# Patient Record
(demographics unavailable — no encounter records)

---

## 2025-06-25 NOTE — ADDENDUM
[FreeTextEntry1] : This note was written by Kenji Manjarrez on 06/25/2025 acting as scribe for Dr. Martinez Pedraza M.D.   I, Dr. Martinez Pedraza, have read and attest that all the information, medical decision making and discharge instructions within are true and accurate.

## 2025-06-25 NOTE — HISTORY OF PRESENT ILLNESS
[de-identified] : CORBY SIMON  65 year old female presents for evaluation of right knee pain for a 5-yr duration with no one incident of injury but is on and off with several different types of injury. 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 She states that she is now 180 pounds and was 250 pounds previously and noticed that it was contributing to the knee pain. It is mostly medial pain but mostly dull than sharp but can be sharp at times with swelling but no mechanical Sx. She can walk up to 1 mile and stairs are hard. She takes Advil for intermittent pain relief. She is doing PT and has not had any injection or surgery. 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 Allergy to Penicillin, Vancomycin with hearing loss, Medrol Dosepak leading to rash, and Pantoprazole which causes rash. She is interested in continuing PT and injections vs. surgery. She did get an MRI of the right knee on June 17 which showed diffuse tricompartmental degenerative arthritis moderate medial compartment with associated complex degenerative tearing of the body segment.

## 2025-06-25 NOTE — PROCEDURE
[de-identified] : RIGHT KNEE CORTISONE INJECTION Discussed at length with the patient the planned steroid and lidocaine injection. The risks, benefits, convalescence and alternatives were reviewed. The possible side effects discussed included but were not limited to: pain, swelling, heat and redness. These symptoms are generally mild but if they are extensive then contact the office. Giving pain relievers by mouth such as NSAIDs or Tylenol can generally treat the reactions to steroid and lidocaine. Rare cases of infection have been noted. Rash, hives and itching may occur post injection. If you have muscle pain or cramps, flushing and or swelling of the face, rapid heart beat, nausea, dizziness, fever, chills, headache, difficulty breathing, swelling in the arms or legs, or have a prickly feeling of your skin, contact a health care provider immediately.   Following this discussion, the knee was prepped with betadine and under sterile conditions 5 cc of 2% lidocaine and 1 cc depo-medrol (40mg) were injected with a 21 gauge needle. The needle was introduced into the joint, aspiration was performed to ensure intra-articular placement and the medication was injected. Upon withdrawal of the needle the site was cleaned with alcohol and a bandaid applied. The patient tolerated the injection well and there were no adverse effects. Post injection instructions included no strenuous activity for 24 hours, cryotherapy and if there are any adverse effects to contact the office.

## 2025-06-25 NOTE — DISCUSSION/SUMMARY
[de-identified] : Discussed at length the nature of the patients condition. Their right knee symptoms appear secondary to degenerative arthritis and torn medial meniscus. We reviewed operative and nonoperative treatment. In the future, she may need a TKR as I do not think a surgical arthroscopy is warranted. At the present time, we will begin nonoperatively. We will schedule for right knee viscosupplementation injections.  In the interim, for the acute pain patient was given a right knee cortisone injection today as detailed above for symptomatic relief. I also suggested PT, weight loss, and Aleve prn. They can continue activities as tolerated. This was explained in the presence of their .

## 2025-06-25 NOTE — PHYSICAL EXAM
[de-identified] : General appearance: well-nourished and hydrated, pleasant, alert and oriented x 3, cooperative. obese, pendulous thighs HEENT: Normocephalic, EOM intact, Nasal septum midline, Oral cavity clear, External auditory canal clear. Cardiovascular: no apparent abnormalities, no lower leg edema, no varicosities, pedal pulses are palpable. Lymphatics Lymph nodes: none palpated, Lymphedema: not present. Neurologic: sensation is normal, no muscle weakness in upper or lower extremities, patella tendon reflexes intact. Dermatologic no apparent skin lesions, moist, warm, no rash. Spine: cervical spine appears normal and moves freely, thoracic spine appears normal and moves freely, lumbosacral spine appears normal and moves freely. Gait: nonantalgic.   Left knee Inspection: no effusion or erythema. Wounds: none. Alignment: normal. Palpation: no specific tenderness on palpation. ROM active (in degrees): 0-110 with no instability. Ligamentous laxity: all ligaments appear stable,, negative ant. drawer test, negative post. drawer test, stable to varus stress test, stable to valgus stress test. negative Lachman's test, negative pivot shift test Meniscal Test: negative McMurrays, negative Adriel. Patellofemoral Alignment Test: Q angle-, normal. Muscle Test: good quad strength. Leg examination: calf is soft and non-tender.   Right knee Inspection: mild effusion. Wounds: none. Alignment: normal. Palpation: medial tenderness on palpation. ROM active (in degrees): 0-90 with pain and crepitus especially at extremes. Ligamentous laxity: all ligaments appear stable, negative ant. drawer test, negative post. drawer test, stable to varus stress test, stable to valgus stress test. negative Lachman's test, negative pivot shift test Meniscal Test: negative McMurrays, negative Adriel. Patellofemoral Alignment Test: Q angle-, normal. Muscle Test: good quad strength. Leg examination: calf soft and non tender.   Left hip Inspection: No swelling or ecchymosis. Wounds: none. Palpation: non-tender. Stability: no instability. Strength: 5/5 all motor groups. ROM: no pain with FROM. Leg length: equal.   Right hip Inspection: No swelling or ecchymosis. Wounds: none. Palpation: non-tender. Stability: no instability. Strength: 5/5 all motor groups. ROM: no pain with FROM. Leg length: equal.   Left ankle Inspection: no erythema noted, no swelling noted. Palpation: no pain on palpation. ROM: FROM without crepitus. Muscle strength: 5/5. Stability: no instability noted.   Right ankle Inspection: no erythema noted, no swelling noted. ROM: FROM without crepitus. Palpation: no pain on palpation. Muscle strength: 5/5. Stability: no instability noted.   Left foot Inspection: color, texture and turgor are normal. medial exostosis 1st MTP. ROM: full range of motion of all joints without pain or crepitus. Palpation: no tenderness. Stability: no instability noted.   Right foot Inspection: color, texture and turgor are normal. medial exostosis 1st MTP. ROM: full range of motion of all joints without pain or crepitus. Palpation: no tenderness. Stability: no instability noted.   Left shoulder Inspection: no muscle asymmetry, no atrophy. Palpation: no tenderness noted, ACJ non-tender. ROM: full active ROM, full passive ROM. Strength testing): anterior deltoid, supraspinatus, infraspinatus, subscapularis all 5/5. Stability test: ant. apprehension negative, post. apprehension negative, relocation test negative. Impingement Test: negative NEER.   Right shoulder Inspection: no muscle asymmetry, no atrophy. Palpation: no tenderness noted, ACJ non-tender. ROM: full active ROM, full passive ROM. Strength testing): anterior deltoid, supraspinatus, infraspinatus, subscapularis all 5/5. Stability test: ant. apprehension negative, post. apprehension negative, relocation test negative. Impingement Test: negative NEER. Surgical Wounds: none.   Left elbow Inspection: negative swelling. Wounds: none. Palpation: non-tender. ROM: full ROM. Strength: 5/5 all groups. Stability: no instability. Mass: none.   Right elbow Inspection: negative swelling. Wounds: none. Palpation: non-tender. ROM: full ROM. Strength: 5/5 all groups. Stability: no instability. Mass: none.   Left wrist Inspection: negative swelling. Wound: none. Palpation (bone): no tenderness. ROM: full ROM. Strength: full , good.   Right wrist Inspection: negative swelling. Wound: none. Palpation (bone): no tenderness. ROM: full ROM. Strength: full , good.   Left hand Inspection: no skin changes, normal appearance. Wounds: none. Strength: full , able to make full fist. Sensation: light touch intact all fingers and thumb. Vascular: good capillary refill < 3 seconds, all fingers and thumb. Mass: none.   Right hand Inspection: no skin changes, normal appearance. Wounds: none. Strength: full , able to make full fist. Sensation: light touch intact all fingers and thumb. Vascular: good capillary refill < 3 seconds, all fingers and thumb. Mass: none.    [de-identified] : Right knee x-rays, standing AP/Lateral and Merchant films, and 45-degree PA standing view, taken at the office today shows degenerative arthritis, normal alignment, medial joint space narrowing, patella at appropriate height and center position, patellofemoral joint space narrowing, small osteophytes, Kellgren and Adis grade 2-3   Left knee x-ray merchant view taken at the office today demonstrates joint space narrowing and a well centered patella.  MRI taken 6/17/25 report is on the chart and was reviewed.